# Patient Record
Sex: MALE | Race: WHITE | HISPANIC OR LATINO | Employment: FULL TIME | URBAN - METROPOLITAN AREA
[De-identification: names, ages, dates, MRNs, and addresses within clinical notes are randomized per-mention and may not be internally consistent; named-entity substitution may affect disease eponyms.]

---

## 2019-10-03 ENCOUNTER — HOSPITAL ENCOUNTER (EMERGENCY)
Facility: MEDICAL CENTER | Age: 29
End: 2019-10-03
Attending: EMERGENCY MEDICINE

## 2019-10-03 VITALS
DIASTOLIC BLOOD PRESSURE: 92 MMHG | HEART RATE: 96 BPM | HEIGHT: 74 IN | WEIGHT: 315 LBS | SYSTOLIC BLOOD PRESSURE: 162 MMHG | BODY MASS INDEX: 40.43 KG/M2 | TEMPERATURE: 98.6 F | RESPIRATION RATE: 18 BRPM

## 2019-10-03 DIAGNOSIS — L08.9 FACIAL INFECTION: ICD-10-CM

## 2019-10-03 DIAGNOSIS — K08.89 PAIN, DENTAL: ICD-10-CM

## 2019-10-03 DIAGNOSIS — K04.7 DENTAL INFECTION: ICD-10-CM

## 2019-10-03 PROCEDURE — 99283 EMERGENCY DEPT VISIT LOW MDM: CPT

## 2019-10-03 RX ORDER — PENICILLIN V POTASSIUM 500 MG/1
500 TABLET ORAL 3 TIMES DAILY
Qty: 30 TAB | Refills: 0 | Status: SHIPPED | OUTPATIENT
Start: 2019-10-03 | End: 2019-10-13

## 2019-10-03 RX ORDER — CHLORHEXIDINE GLUCONATE ORAL RINSE 1.2 MG/ML
15 SOLUTION DENTAL 2 TIMES DAILY
Qty: 1 BOTTLE | Refills: 0 | Status: SHIPPED | OUTPATIENT
Start: 2019-10-03 | End: 2023-05-02

## 2019-10-03 RX ORDER — IBUPROFEN 600 MG/1
600 TABLET ORAL EVERY 8 HOURS PRN
Qty: 30 TAB | Refills: 0 | Status: SHIPPED | OUTPATIENT
Start: 2019-10-03 | End: 2023-05-02

## 2019-10-03 SDOH — HEALTH STABILITY: MENTAL HEALTH: HOW MANY STANDARD DRINKS CONTAINING ALCOHOL DO YOU HAVE ON A TYPICAL DAY?: 1 OR 2

## 2019-10-03 SDOH — HEALTH STABILITY: MENTAL HEALTH: HOW OFTEN DO YOU HAVE A DRINK CONTAINING ALCOHOL?: MONTHLY OR LESS

## 2019-10-03 NOTE — ED PROVIDER NOTES
"                                     ED Provider Note    Scribed for Clay Bhagat M.D. by Sen Jimenes. 10/3/2019  1:50 AM    CHIEF COMPLAINT  Chief Complaint   Patient presents with   • Tooth Abscess     right lower       HPI  Jonathon Jon is a 29 y.o. male who presents with a right lower tooth abscess onset 24 hours ago. He has associated swelling and mild discomfort to the area. He denies headaches and vision changes. He states he was eating a burger yesterday when he noticed the area began to swell. He denies any recent trauma to the area. He states he has a tooth infection and open cavity, and hasn't seen a dentist in 1-2 years. He started taking over the counter penicillin yesterday for the infection, with minimal alleviation. He denies any allergies. The patient states he has no primary care physician.    REVIEW OF SYSTEMS  Constitutional: No fevers, chills, or recent illness.  Eyes: No change in vision.  ENT: Right lower tooth abscess with associated swelling and mild discomfort.  Neuro: No HA.  See HPI for further details.     PAST MEDICAL HISTORY  None noted.    SOCIAL HISTORY  Social History     Tobacco Use   • Smoking status: Never Smoker   • Smokeless tobacco: Never Used   Substance and Sexual Activity   • Alcohol use: Yes     Frequency: Monthly or less     Drinks per session: 1 or 2   • Drug use: Yes     Comment: marijuana   • Sexual activity: None noted       SURGICAL HISTORY  patient denies any surgical history    CURRENT MEDICATIONS  Home Medications     Reviewed by Boby Saha R.N. (Registered Nurse) on 10/03/19 at 0107  Med List Status: Complete   Medication Last Dose Status        Patient Darren Taking any Medications                       ALLERGIES  No Known Allergies    PHYSICAL EXAM  VITAL SIGNS: BP (!) 162/92   Pulse 96   Temp 37 °C (98.6 °F) (Temporal)   Resp 18   Ht 1.88 m (6' 2\")   Wt (!) 184.2 kg (406 lb 1.4 oz)   BMI 52.14 kg/m²    Genl: M sitting in chair comfortably, " speaking clearly, appears in no acute distress  Head: NC/AT   ENT: Right lower tooth infection with right sided cheek abscess and swelling. Fluctuant pocket 1.5 to 2 cm. Minimal erythema. Mucous membranes moist, uvula midline, nares patent bilaterally. No posterior auricular adenopathy.  Mouth: No sublingula fullness, no posterior oropharynx inflammation or anatomical shifts. Pergingival inflammation from teeth 28-30 with no pain with movement or palpation of the teeth. No periapical abscess. Swelling in soft tissue along right lower jawline.  Eyes: Normal sclera, pupils equal round reactive to light  Neck: Supple, FROM. No cervical adenopathy  Pulmonary: Lungs are clear to auscultation bilaterally  Chest: No TTP  CV:  RRR, no murmur appreciated, pulses 2+ in both upper and lower extremities,  Abdomen: Obese, soft, NT/ND; no rebound/guarding, no masses palpated, no HSM   Musculoskeletal: Pain free ROM of the neck. Moving upper and lower extremities and spontaneous in coordinated fashion  Neuro: A&Ox4 (person, place, time, situation), speech fluent, gait steady, no focal deficits appreciated  Psych: Patient has an appropriate affect and behavior  Skin: No rash or lesions.  No pallor or jaundice.  No cyanosis.  Warm and dry.    DIAGNOSTIC STUDIES / PROCEDURES    LABS  Labs Reviewed - No data to display      RADIOLOGY  No orders to display     COURSE & MEDICAL DECISION MAKING  Pertinent Labs & Imaging studies reviewed. (See chart for details)    Differential diagnoses include but not limited to: Parotitis, dental abscess, facial cellulitis    1:50 AM - Patient seen and examined at bedside by resident. The resident discussed the plan of care, including the possibility for incision and drainage of the abscess. Patient verbalizes understanding and agreement to this plan of care.    2:04 AM - Patient seen and examined at bedside. Oral exam performed by me at this time, see above for details. I discussed the plan of care,  including outpatient oral antibiotic rinse and not draining the area at this time. I informed him that the area will likely develop into an abscess within 2 days if it is going to at which point he should return for incision and drainage. I answered all the patient's questions regarding his care. Patient verbalizes understanding and agreement to this plan of care.     2:20 AM -patient evaluated with attending and given penicillin for facial cellulitis no discrete abscess at this point, treated with mouthwash and given return precautions if developing fever, worsening pain, worsening of symptoms.    Medical Decision Making:     ATTENDING ATTESTATOIN  I independently evaluated the patient and repeated the important components of the history and physical. I discussed the management with the resident. I have reviewed and agree with the pertinent clinical information above including history, exam, study findings and recommendations.  I see no signs of acute retropharyngeal infection, there is no drainable abscess at this time.  Agree with the resident's assessment and need for antibiosis and follow-up with a dental provider.  I agree with the return precautions and the need for urgent follow-up care should he develop any worsening facial swelling, dysphasia, voice changes, clinical worsening or breakthrough fevers despite taking antibiotics.    The patient will not drink alcohol nor drive with prescribed medications. The patient will return for worsening symptoms and is stable at the time of discharge. The patient verbalizes understanding and will comply. The patient is referred to a primary physician for blood pressure management, diabetic screening, and for all other preventative health concerns.    DISPOSITION:  Patient will be discharged home in stable condition.    FOLLOW UP:  [facility], Emergency Dept  [facility] [facility] [address] [phone]        Dr. Nelson Rashid  Dental  [phone]    dental follow up    [facility]  [address]  [address]  [place] [address]  [phone]      dental follow up      OUTPATIENT MEDICATIONS:  New Prescriptions    CHLORHEXIDINE (PERIDEX) 0.12 % SOLUTION    Take 15 mL by mouth 2 times a day.    IBUPROFEN (MOTRIN) 600 MG TAB    Take 1 Tab by mouth every 8 hours as needed for Moderate Pain or Inflammation.    PENICILLIN V POTASSIUM (VEETID) 500 MG TAB    Take 1 Tab by mouth 3 times a day for 10 days.         FINAL IMPRESSION  Visit Diagnoses     ICD-10-CM   1. Pain, dental K08.89   2. Dental infection K04.7   3. Facial infection L08.9          Sen MALONEY (Joey), am scribing for, and in the presence of, Clay Bhagat M.D..    Electronically signed by: Sen Jimenes (Joey), 10/3/2019    IClay M.D. personally performed the services described in this documentation, as scribed by Sen Jimenes in my presence, and it is both accurate and complete. E    The note accurately reflects work and decisions made by me.  Clay Bhagat  10/3/2019  7:39 AM

## 2019-10-03 NOTE — ED TRIAGE NOTES
Jonathon Jon  29 y.o.  male  Chief Complaint   Patient presents with   • Tooth Abscess     right lower     Present to triage c/o tooth abscess x 2 days. Swelling noted.

## 2019-10-03 NOTE — ED NOTES
Discharge instructions given to pt, pt verbalized understanding. VSS. Sent two prescriptions. All personal belongings accounted for. Pt ambulated safely. No IV.

## 2022-12-12 ENCOUNTER — OFFICE VISIT (OUTPATIENT)
Dept: URGENT CARE | Facility: PHYSICIAN GROUP | Age: 32
End: 2022-12-12

## 2022-12-12 VITALS
WEIGHT: 315 LBS | HEIGHT: 74 IN | BODY MASS INDEX: 40.43 KG/M2 | SYSTOLIC BLOOD PRESSURE: 130 MMHG | RESPIRATION RATE: 18 BRPM | DIASTOLIC BLOOD PRESSURE: 78 MMHG | HEART RATE: 92 BPM | OXYGEN SATURATION: 98 % | TEMPERATURE: 98.1 F

## 2022-12-12 DIAGNOSIS — R11.2 NAUSEA AND VOMITING, UNSPECIFIED VOMITING TYPE: ICD-10-CM

## 2022-12-12 PROCEDURE — 99202 OFFICE O/P NEW SF 15 MIN: CPT | Performed by: STUDENT IN AN ORGANIZED HEALTH CARE EDUCATION/TRAINING PROGRAM

## 2022-12-12 NOTE — PROGRESS NOTES
"Subjective:   CHIEF COMPLAINT  Chief Complaint   Patient presents with    Abdominal Pain     Needs note for work , had abd pain last week and vomiting but now feels better .        HPI  Jonathon Jon is a 32 y.o. male who presents requesting out of work.  Last week on Wednesday the patient had so she subsequently developed nausea and vomiting for approximately 24 hours which is since resolved.  No longer experiencing nausea or vomiting.  No abdominal pain or fevers.  Currently asymptomatic.    REVIEW OF SYSTEMS  General: no fever or chills  GI: no nausea or vomiting  See HPI for further details.    PAST MEDICAL HISTORY  There are no problems to display for this patient.      SURGICAL HISTORY  patient denies any surgical history    ALLERGIES  No Known Allergies    CURRENT MEDICATIONS  Home Medications       Reviewed by Josh Bishop D.O. (Physician) on 12/12/22 at 1530  Med List Status: <None>     Medication Last Dose Status   chlorhexidine (PERIDEX) 0.12 % Solution Not Taking Active   ibuprofen (MOTRIN) 600 MG Tab Not Taking Active                    SOCIAL HISTORY  Social History     Tobacco Use    Smoking status: Never    Smokeless tobacco: Never   Vaping Use    Vaping Use: Never used   Substance and Sexual Activity    Alcohol use: Yes    Drug use: Yes     Comment: marijuana    Sexual activity: Not on file       FAMILY HISTORY  No family history on file.       Objective:   PHYSICAL EXAM  VITAL SIGNS: /78 (BP Location: Right arm, Patient Position: Sitting, BP Cuff Size: Adult)   Pulse 92   Temp 36.7 °C (98.1 °F) (Temporal)   Resp 18   Ht 1.88 m (6' 2\")   Wt (!) 174 kg (384 lb)   SpO2 98%   BMI 49.30 kg/m²     Gen: no acute distress, normal voice  Skin: dry, intact, moist mucosal membranes  Head: Atraumatic, normocephalic  Psych: normal affect, normal judgement, alert, awake         Assessment/Plan:     1. Nausea and vomiting, unspecified vomiting type        Resolved.  Likely secondary to sushi.  " Provided a note for work. Return to urgent care any new/worsening symptoms or further questions or concerns.  Patient understood everything discussed.  All questions were answered.      Differential diagnosis, natural history, supportive care, and indications for immediate follow-up discussed. All questions answered. Patient agrees with the plan of care.    Follow-up as needed if symptoms worsen or fail to improve to PCP, Urgent care or Emergency Room.    Please note that this dictation was created using voice recognition software. I have made a reasonable attempt to correct obvious errors, but I expect that there are errors of grammar and possibly content that I did not discover before finalizing the note.

## 2022-12-12 NOTE — LETTER
December 12, 2022       Patient: Jonathon Jon   YOB: 1990   Date of Visit: 12/12/2022         To Whom It May Concern:    Jonathon Jon was seen in urgent care and excused from work for Wednesday and Thursday last week.     If you have any questions or concerns, please don't hesitate to call [phone]          Sincerely,          Josh Bishop D.O.  Electronically Signed

## 2023-02-12 ENCOUNTER — APPOINTMENT (OUTPATIENT)
Dept: URGENT CARE | Facility: CLINIC | Age: 33
End: 2023-02-12
Payer: COMMERCIAL

## 2023-02-12 ENCOUNTER — HOSPITAL ENCOUNTER (EMERGENCY)
Facility: MEDICAL CENTER | Age: 33
End: 2023-02-12
Attending: EMERGENCY MEDICINE
Payer: COMMERCIAL

## 2023-02-12 VITALS
TEMPERATURE: 97.5 F | SYSTOLIC BLOOD PRESSURE: 143 MMHG | DIASTOLIC BLOOD PRESSURE: 83 MMHG | HEART RATE: 104 BPM | WEIGHT: 315 LBS | RESPIRATION RATE: 19 BRPM | BODY MASS INDEX: 48.4 KG/M2 | OXYGEN SATURATION: 97 %

## 2023-02-12 DIAGNOSIS — B34.9 VIRAL ILLNESS: ICD-10-CM

## 2023-02-12 LAB
FLUAV RNA SPEC QL NAA+PROBE: NEGATIVE
FLUBV RNA SPEC QL NAA+PROBE: NEGATIVE
RSV RNA SPEC QL NAA+PROBE: NEGATIVE
SARS-COV-2 RNA RESP QL NAA+PROBE: NOTDETECTED
SPECIMEN SOURCE: NORMAL

## 2023-02-12 PROCEDURE — C9803 HOPD COVID-19 SPEC COLLECT: HCPCS | Performed by: EMERGENCY MEDICINE

## 2023-02-12 PROCEDURE — 99283 EMERGENCY DEPT VISIT LOW MDM: CPT

## 2023-02-12 PROCEDURE — 0241U HCHG SARS-COV-2 COVID-19 NFCT DS RESP RNA 4 TRGT MIC: CPT

## 2023-02-12 NOTE — Clinical Note
Jonathon Jon was seen and treated in our emergency department on 2/12/2023.  He may return to work on 02/14/2023.       If you have any questions or concerns, please don't hesitate to call.      Maame Gonzalez M.D.

## 2023-02-13 NOTE — ED TRIAGE NOTES
Flu like symptoms since 2pm today, has not taken anything for it.  States body aches, chills, nausea.  NO vomiting, denies smoking.  No major sob.

## 2023-02-13 NOTE — ED NOTES
Pt signed and given d/c papers. Discussed f/u if needed and negative COVID/flu/RSV test today. Pt denies further questions/concerns. Pt ambulated out of the dept w/ steady gait A&O x4 w/ all belongings.

## 2023-02-13 NOTE — ED PROVIDER NOTES
ED Provider Note    CHIEF COMPLAINT  Chief Complaint   Patient presents with    Flu Like Symptoms           HPI/ROS      Jonathon Jon is a 32 y.o. male who presents for evaluation of flulike symptoms since he woke up around 2:00 this afternoon.  He woke up with fever and body aches and exhausted.  He said he could not even get out of bed.  It took him a little while to get here.  He is due to work tonight and will need a work note.    PAST MEDICAL HISTORY       SURGICAL HISTORY  patient denies any surgical history    FAMILY HISTORY  No family history on file.    SOCIAL HISTORY  Social History     Tobacco Use    Smoking status: Never    Smokeless tobacco: Never   Vaping Use    Vaping Use: Never used   Substance and Sexual Activity    Alcohol use: Yes    Drug use: Yes     Comment: marijuana    Sexual activity: Not on file       CURRENT MEDICATIONS  Home Medications       Reviewed by Laura Calderon R.N. (Registered Nurse) on 02/12/23 at 1853  Med List Status: <None>     Medication Last Dose Status   chlorhexidine (PERIDEX) 0.12 % Solution  Active   ibuprofen (MOTRIN) 600 MG Tab  Active                    ALLERGIES  No Known Allergies    PHYSICAL EXAM  VITAL SIGNS: BP (!) 143/83   Pulse (!) 104   Temp 36.4 °C (97.5 °F) (Temporal)   Resp 19   Wt (!) 171 kg (376 lb 15.8 oz)   SpO2 97%   BMI 48.40 kg/m²    Constitutional: Alert in no apparent distress.  HENT: Normocephalic, Atraumatic, Bilateral external ears normal. Nose normal.   Eyes: Conjunctiva normal, non-icteric.   Heart: Regular rate and rhythm, no murmurs.   Lungs: Non-labored respirations, clear to auscultation  Skin: Warm, Dry, No erythema, No rash.   Neurologic: Alert, Grossly non-focal.   Psychiatric: Affect normal, Judgment normal, Mood normal, Appears appropriate and not intoxicated.         DIAGNOSTIC STUDIES / PROCEDURES      LABS  Results for orders placed or performed during the hospital encounter of 02/12/23   CoV-2, FLU A/B, and RSV by PCR (2-4  Hours CEPHEID) : Collect NP swab in VTM    Specimen: Respirate   Result Value Ref Range    Influenza virus A RNA Negative Negative    Influenza virus B, PCR Negative Negative    RSV, PCR Negative Negative    SARS-CoV-2 by PCR NotDetected     SARS-CoV-2 Source NP Swab            COURSE & MEDICAL DECISION MAKING    ED Observation Status? No; Patient does not meet criteria for ED Observation.     INITIAL ASSESSMENT, COURSE AND PLAN  Care Narrative: This is a 33 yo man presenting with symptoms consistent with viral syndrome. He has no difficulty breathing, no hypoxia. Viral testing is negative. Given reassuring vital signs patient will be discharged with continued supportive care.  He has no urinary symptoms abdominal pain or other things concerning for serious bacterial illness.  Patient is agreeable to this plan will be discharged.      DISPOSITION AND DISCUSSIONS      Escalation of care considered, and ultimately not performed:blood analysis and diagnostic imaging     The patient will return for new or worsening symptoms and is stable at the time of discharge. Patient was given return precautions. Patient and/or family member verbalizes understanding and will comply.    DISPOSITION:  Patient will be discharged home in stable condition.    FOLLOW UP:  [facility], Emergency Dept  [address] [place] [id number] [phone]    Return for worsening shortness of breath, difficulty breathing or other concerns.      OUTPATIENT MEDICATIONS:  New Prescriptions    No medications on file         FINAL DIAGNOSIS  1. Viral illness           Electronically signed by: Maame Gonzalez M.D., 2/12/2023 7:43 PM

## 2023-05-02 ENCOUNTER — OFFICE VISIT (OUTPATIENT)
Dept: URGENT CARE | Facility: PHYSICIAN GROUP | Age: 33
End: 2023-05-02
Payer: COMMERCIAL

## 2023-05-02 VITALS
BODY MASS INDEX: 41.75 KG/M2 | RESPIRATION RATE: 16 BRPM | DIASTOLIC BLOOD PRESSURE: 68 MMHG | WEIGHT: 315 LBS | OXYGEN SATURATION: 99 % | SYSTOLIC BLOOD PRESSURE: 126 MMHG | HEART RATE: 89 BPM | TEMPERATURE: 98.5 F | HEIGHT: 73 IN

## 2023-05-02 DIAGNOSIS — R19.7 DIARRHEA, UNSPECIFIED TYPE: ICD-10-CM

## 2023-05-02 DIAGNOSIS — R53.83 FATIGUE, UNSPECIFIED TYPE: ICD-10-CM

## 2023-05-02 PROCEDURE — 99213 OFFICE O/P EST LOW 20 MIN: CPT | Performed by: PHYSICIAN ASSISTANT

## 2023-05-02 ASSESSMENT — ENCOUNTER SYMPTOMS
BLOOD IN STOOL: 0
CHILLS: 0
CONSTIPATION: 0
DIARRHEA: 1
VOMITING: 0
NAUSEA: 1
ABDOMINAL PAIN: 1

## 2023-05-02 NOTE — LETTER
May 2, 2023         Patient: Jonathon Jon   YOB: 1990   Date of Visit: 5/2/2023           To Whom it May Concern:    Jonathon Jon was seen in my clinic on 5/2/2023. He may return to work on 05/05/2023.    If you have any questions or concerns, please don't hesitate to call.        Sincerely,           Nicci Zavala P.A.-C.  Electronically Signed

## 2023-05-02 NOTE — PROGRESS NOTES
Subjective     Jonathon Jon is a 32 y.o. male who presents with Diarrhea (Onset 1 day) and Fatigue (Onset 1 day/)    PMH:Reviewed with patient/family member/EPIC.   MEDS: No current outpatient medications on file.  ALLERGIES: No Known Allergies  SURGHX: History reviewed. No pertinent surgical history.  SOCHX:  reports that he has never smoked. He has never used smokeless tobacco. He reports current alcohol use. He reports current drug use.  FH: Reviewed with patient, not pertinent to this visit.           Patient presents with complaint of nausea, crampy abdominal pain prior to multiple episodes of watery diarrhea, and fatigue since yesterday.  Patient denies fever, chills, dysuria, chest pain or shortness of breath.  Patient denies recent antibiotics.  Patient feels he likely ate something that did not agree with him but is not sure what.  Patient states he has been around people at work with similar symptoms.  Patient has tried over-the-counter Pepto-Bismol with no relief of his symptoms.  Patient needs a note for work.        Diarrhea   This is a new problem. The current episode started today. The problem occurs 5 to 10 times per day. The problem has been waxing and waning. The stool consistency is described as Watery. The patient states that diarrhea does not awaken him from sleep. Associated symptoms include abdominal pain (Crampy). Pertinent negatives include no chills or vomiting. Exacerbated by: P.o. intake. Risk factors include suspect food intake and ill contacts. He has tried bismuth subsalicylate for the symptoms. The treatment provided mild relief.     Review of Systems   Constitutional:  Positive for malaise/fatigue. Negative for chills.   Gastrointestinal:  Positive for abdominal pain (Crampy), diarrhea and nausea. Negative for blood in stool, constipation, melena and vomiting.   All other systems reviewed and are negative.           Objective     /68 (BP Location: Right arm, Patient Position:  "Sitting, BP Cuff Size: Adult long)   Pulse 89   Temp 36.9 °C (98.5 °F) (Temporal)   Resp 16   Ht 1.854 m (6' 1\")   Wt (!) 170 kg (374 lb)   SpO2 99%   BMI 49.34 kg/m²      Physical Exam  Vitals and nursing note reviewed.   Constitutional:       General: He is not in acute distress.     Appearance: Normal appearance. He is well-developed and normal weight. He is not ill-appearing or toxic-appearing.   HENT:      Head: Normocephalic and atraumatic.      Right Ear: Tympanic membrane normal.      Left Ear: Tympanic membrane normal.      Nose: Nose normal.      Mouth/Throat:      Lips: Pink.      Mouth: Mucous membranes are moist.      Pharynx: Oropharynx is clear. Uvula midline.   Eyes:      Extraocular Movements: Extraocular movements intact.      Conjunctiva/sclera: Conjunctivae normal.      Pupils: Pupils are equal, round, and reactive to light.   Cardiovascular:      Rate and Rhythm: Normal rate and regular rhythm.      Pulses: Normal pulses.      Heart sounds: Normal heart sounds.   Pulmonary:      Effort: Pulmonary effort is normal.      Breath sounds: Normal breath sounds.   Abdominal:      General: Bowel sounds are normal.      Palpations: Abdomen is soft.   Musculoskeletal:         General: Normal range of motion.      Cervical back: Normal range of motion and neck supple.   Skin:     General: Skin is warm and dry.      Capillary Refill: Capillary refill takes less than 2 seconds.   Neurological:      General: No focal deficit present.      Mental Status: He is alert and oriented to person, place, and time.      Cranial Nerves: No cranial nerve deficit.      Motor: Motor function is intact.      Coordination: Coordination is intact.      Gait: Gait normal.   Psychiatric:         Mood and Affect: Mood normal.                           Assessment & Plan             1. Diarrhea, unspecified type        2. Fatigue, unspecified type          Patient HPI and physical exam is consistent with viral " gastroenteritis, likely from sick contact at work or food but patient is not sure which since it could be either 1.  Patient has not had any bloody diarrhea or fever.  I do not feel that stool cultures are indicated at this time.  Patient can continue taking over-the-counter Pepto-Bismol, ginger ale and bland diet.    PT to follow clear diet for 8-12 hours, then progress to bland diet for 24 hours, then progress to regular diet as tolerated.     PT should follow up with PCP in 1-2 days for re-evaluation if symptoms have not improved.      Discussed red flags and reasons to return to UC or ED.      Pt and/or family verbalized understanding of diagnosis and follow up instructions and was offered informational handout on diagnosis.  PT discharged.     Please note that this dictation was created using voice recognition software. I have made every reasonable attempt to correct obvious errors, but I expect that there may be errors of grammar and possibly content that I did not discover before finalizing the note.

## 2023-05-02 NOTE — LETTER
May 2, 2023         Patient: Jonathon Jon   YOB: 1990   Date of Visit: 5/2/2023           To Whom it May Concern:    Jonathon Jon was seen in my clinic on 5/2/2023. He may return to work on 5/5/2023. Please excuse him for 5/1/2023 as well.    If you have any questions or concerns, please don't hesitate to call.        Sincerely,           Nicci Zavala P.A.-C.  Electronically Signed

## 2023-08-12 ENCOUNTER — OFFICE VISIT (OUTPATIENT)
Dept: URGENT CARE | Facility: PHYSICIAN GROUP | Age: 33
End: 2023-08-12
Payer: COMMERCIAL

## 2023-08-12 VITALS
RESPIRATION RATE: 16 BRPM | DIASTOLIC BLOOD PRESSURE: 82 MMHG | TEMPERATURE: 99.1 F | HEIGHT: 73 IN | SYSTOLIC BLOOD PRESSURE: 118 MMHG | HEART RATE: 93 BPM | BODY MASS INDEX: 41.75 KG/M2 | OXYGEN SATURATION: 98 % | WEIGHT: 315 LBS

## 2023-08-12 DIAGNOSIS — R19.7 DIARRHEA, UNSPECIFIED TYPE: ICD-10-CM

## 2023-08-12 PROCEDURE — 99213 OFFICE O/P EST LOW 20 MIN: CPT | Performed by: FAMILY MEDICINE

## 2023-08-12 PROCEDURE — 3079F DIAST BP 80-89 MM HG: CPT | Performed by: FAMILY MEDICINE

## 2023-08-12 PROCEDURE — 3074F SYST BP LT 130 MM HG: CPT | Performed by: FAMILY MEDICINE

## 2023-08-12 ASSESSMENT — ENCOUNTER SYMPTOMS
EYE REDNESS: 0
EYE DISCHARGE: 0
VOMITING: 0
MYALGIAS: 0
WEIGHT LOSS: 0
NAUSEA: 0

## 2023-08-12 NOTE — LETTER
August 12, 2023         Patient: Jonathon Jon   YOB: 1990   Date of Visit: 8/12/2023           To Whom it May Concern:    Jonathon Jon was seen in my clinic on 8/12/2023. Please excuse work absence 8/11/2023. He may return to his scheduled shift to full duty if symptoms have resolve for 24 hours.     Sincerely,           Tomas Rausch M.D.  Electronically Signed

## 2024-04-27 ENCOUNTER — OFFICE VISIT (OUTPATIENT)
Dept: URGENT CARE | Facility: PHYSICIAN GROUP | Age: 34
End: 2024-04-27

## 2024-04-27 VITALS
HEART RATE: 107 BPM | SYSTOLIC BLOOD PRESSURE: 132 MMHG | BODY MASS INDEX: 40.43 KG/M2 | OXYGEN SATURATION: 98 % | TEMPERATURE: 98.5 F | DIASTOLIC BLOOD PRESSURE: 76 MMHG | HEIGHT: 74 IN | WEIGHT: 315 LBS | RESPIRATION RATE: 18 BRPM

## 2024-04-27 DIAGNOSIS — L03.011 PARONYCHIA OF RIGHT RING FINGER: ICD-10-CM

## 2024-04-27 PROCEDURE — 3078F DIAST BP <80 MM HG: CPT | Performed by: PHYSICIAN ASSISTANT

## 2024-04-27 PROCEDURE — 99214 OFFICE O/P EST MOD 30 MIN: CPT | Performed by: PHYSICIAN ASSISTANT

## 2024-04-27 PROCEDURE — 3075F SYST BP GE 130 - 139MM HG: CPT | Performed by: PHYSICIAN ASSISTANT

## 2024-04-27 RX ORDER — CEPHALEXIN 500 MG/1
500 CAPSULE ORAL 4 TIMES DAILY
Qty: 28 CAPSULE | Refills: 0 | Status: SHIPPED | OUTPATIENT
Start: 2024-04-27 | End: 2024-05-04

## 2024-04-27 ASSESSMENT — ENCOUNTER SYMPTOMS
FEVER: 0
NUMBNESS: 0

## 2024-04-27 NOTE — PROGRESS NOTES
"Subjective     Jonathon Jon is a 33 y.o. male who presents with Insect Bite (Right tip ring finger  ,possible spider bite last Saturday ,swollen, red )            Patient presents with infection of right ring finger.  Pt not sure if it is a bite or hangnail but painful and swollen.  Pt has taken some otc motrin but otherwise no otc medication.          Wound Infection  This is a new problem. The current episode started in the past 7 days. The problem occurs constantly. The problem has been gradually worsening. Pertinent negatives include no fever or numbness. The symptoms are aggravated by bending. He has tried NSAIDs for the symptoms. The treatment provided no relief.       Review of Systems   Constitutional:  Negative for fever.   Neurological:  Negative for numbness.   All other systems reviewed and are negative.             Objective     /76   Pulse (!) 107   Temp 36.9 °C (98.5 °F) (Temporal)   Resp 18   Ht 1.88 m (6' 2\")   Wt (!) 167 kg (369 lb)   SpO2 98%   BMI 47.38 kg/m²      Physical Exam  Vitals and nursing note reviewed.   Constitutional:       General: He is not in acute distress.     Appearance: Normal appearance. He is well-developed and normal weight.   HENT:      Head: Normocephalic and atraumatic.      Nose: Nose normal.      Mouth/Throat:      Mouth: Mucous membranes are moist.   Eyes:      Extraocular Movements: Extraocular movements intact.      Conjunctiva/sclera: Conjunctivae normal.      Pupils: Pupils are equal, round, and reactive to light.   Cardiovascular:      Rate and Rhythm: Normal rate and regular rhythm.      Pulses: Normal pulses.      Heart sounds: Normal heart sounds.   Pulmonary:      Effort: Pulmonary effort is normal.      Breath sounds: Normal breath sounds.   Abdominal:      Palpations: Abdomen is soft.   Musculoskeletal:        Hands:       Cervical back: Normal range of motion and neck supple.   Skin:     General: Skin is warm and dry.      Capillary Refill: " Capillary refill takes less than 2 seconds.   Neurological:      General: No focal deficit present.      Mental Status: He is alert and oriented to person, place, and time.      Motor: No abnormal muscle tone.   Psychiatric:         Mood and Affect: Mood normal.                             Assessment & Plan        1. Paronychia of right ring finger  cephALEXin (KEFLEX) 500 MG Cap        PT HPI and PE are consistent with  paronychia of right ring finger.  Pt politely declined I and D of paronychia, prefers to take oral antibiotics for treatment.      PT to soak injured area in epsom salt/warm water soaks, 1-2 times daily until significantly improved.       Differential diagnosis, supportive care, and indications for immediate follow-up discussed with patient.  Instructed to return to clinic or nearest emergency department for any change in condition, further concerns, or worsening of symptoms.    I personally reviewed prior external notes and test results pertinent to today's visit.  I have independently reviewed and interpreted all diagnostics ordered during this urgent care visit.    PT should follow up with PCP in 1-2 days for re-evaluation if symptoms have not improved.      Discussed red flags and reasons to return to  or ED.      Pt and/or family verbalized understanding of diagnosis and follow up instructions and was offered informational handout on diagnosis.  PT discharged.     Please note that this dictation was created using voice recognition software. I have made every reasonable attempt to correct obvious errors, but I expect that there may be errors of grammar and possibly content that I did not discover before finalizing the note.

## 2024-05-02 ENCOUNTER — NON-PROVIDER VISIT (OUTPATIENT)
Dept: URGENT CARE | Facility: PHYSICIAN GROUP | Age: 34
End: 2024-05-02

## 2024-05-02 DIAGNOSIS — Z02.1 PRE-EMPLOYMENT DRUG SCREENING: ICD-10-CM

## 2024-05-02 PROCEDURE — 80305 DRUG TEST PRSMV DIR OPT OBS: CPT | Performed by: FAMILY MEDICINE

## 2024-05-03 LAB
AMP AMPHETAMINE: NORMAL
COC COCAINE: NORMAL
INT CON NEG: NORMAL
INT CON POS: NORMAL
MET METHAMPHETAMINES: NORMAL
OPI OPIATES: NORMAL
PCP PHENCYCLIDINE: NORMAL
POC DRUG COMMENT 753798-OCCUPATIONAL HEALTH: NORMAL
THC: NORMAL